# Patient Record
Sex: FEMALE | URBAN - METROPOLITAN AREA
[De-identification: names, ages, dates, MRNs, and addresses within clinical notes are randomized per-mention and may not be internally consistent; named-entity substitution may affect disease eponyms.]

---

## 2017-04-10 ENCOUNTER — MYC REFILL (OUTPATIENT)
Dept: FAMILY MEDICINE | Facility: CLINIC | Age: 32
End: 2017-04-10

## 2017-04-10 DIAGNOSIS — F33.1 MAJOR DEPRESSIVE DISORDER, RECURRENT EPISODE, MODERATE (H): ICD-10-CM

## 2017-04-10 DIAGNOSIS — F41.9 ANXIETY: ICD-10-CM

## 2017-04-10 NOTE — TELEPHONE ENCOUNTER
5/13/16 OV note say to f/u in 3 weeks.   Route to PCP to see about refilling until she can be seen next month.  Keiko Grey RN

## 2017-04-11 RX ORDER — ALPRAZOLAM 1 MG
1 TABLET ORAL PRN
Qty: 15 TABLET | Refills: 0 | Status: SHIPPED | OUTPATIENT
Start: 2017-04-11 | End: 2017-05-19

## 2017-05-19 ENCOUNTER — OFFICE VISIT (OUTPATIENT)
Dept: FAMILY MEDICINE | Facility: CLINIC | Age: 32
End: 2017-05-19
Payer: COMMERCIAL

## 2017-05-19 VITALS
DIASTOLIC BLOOD PRESSURE: 76 MMHG | SYSTOLIC BLOOD PRESSURE: 120 MMHG | BODY MASS INDEX: 35.36 KG/M2 | RESPIRATION RATE: 20 BRPM | HEIGHT: 70 IN | WEIGHT: 247 LBS | HEART RATE: 68 BPM | TEMPERATURE: 98.8 F

## 2017-05-19 DIAGNOSIS — E66.9 OBESITY, UNSPECIFIED OBESITY SEVERITY, UNSPECIFIED OBESITY TYPE: ICD-10-CM

## 2017-05-19 DIAGNOSIS — E55.9 VITAMIN D DEFICIENCY: ICD-10-CM

## 2017-05-19 DIAGNOSIS — Z13.220 LIPID SCREENING: ICD-10-CM

## 2017-05-19 DIAGNOSIS — Z13.29 SCREENING FOR THYROID DISORDER: ICD-10-CM

## 2017-05-19 DIAGNOSIS — R53.83 OTHER FATIGUE: ICD-10-CM

## 2017-05-19 DIAGNOSIS — Z13.1 SCREENING FOR DIABETES MELLITUS: ICD-10-CM

## 2017-05-19 DIAGNOSIS — F33.1 MAJOR DEPRESSIVE DISORDER, RECURRENT EPISODE, MODERATE (H): ICD-10-CM

## 2017-05-19 DIAGNOSIS — Z30.9 ENCOUNTER FOR CONTRACEPTIVE MANAGEMENT, UNSPECIFIED CONTRACEPTIVE ENCOUNTER TYPE: ICD-10-CM

## 2017-05-19 DIAGNOSIS — F41.9 ANXIETY: ICD-10-CM

## 2017-05-19 DIAGNOSIS — E28.2 PCOS (POLYCYSTIC OVARIAN SYNDROME): ICD-10-CM

## 2017-05-19 DIAGNOSIS — G47.00 INSOMNIA, UNSPECIFIED TYPE: ICD-10-CM

## 2017-05-19 DIAGNOSIS — Z00.01 ENCOUNTER FOR ROUTINE ADULT HEALTH EXAMINATION WITH ABNORMAL FINDINGS: Primary | ICD-10-CM

## 2017-05-19 LAB
ALBUMIN SERPL-MCNC: 3.4 G/DL (ref 3.4–5)
ALP SERPL-CCNC: 76 U/L (ref 40–150)
ALT SERPL W P-5'-P-CCNC: 21 U/L (ref 0–50)
ANION GAP SERPL CALCULATED.3IONS-SCNC: 10 MMOL/L (ref 3–14)
AST SERPL W P-5'-P-CCNC: 11 U/L (ref 0–45)
BILIRUB SERPL-MCNC: 0.4 MG/DL (ref 0.2–1.3)
BUN SERPL-MCNC: 9 MG/DL (ref 7–30)
CALCIUM SERPL-MCNC: 9 MG/DL (ref 8.5–10.1)
CHLORIDE SERPL-SCNC: 105 MMOL/L (ref 94–109)
CHOLEST SERPL-MCNC: 145 MG/DL
CO2 SERPL-SCNC: 25 MMOL/L (ref 20–32)
CREAT SERPL-MCNC: 0.65 MG/DL (ref 0.52–1.04)
GFR SERPL CREATININE-BSD FRML MDRD: NORMAL ML/MIN/1.7M2
GLUCOSE SERPL-MCNC: 93 MG/DL (ref 70–99)
HDLC SERPL-MCNC: 74 MG/DL
HGB BLD-MCNC: 13 G/DL (ref 11.7–15.7)
LDLC SERPL CALC-MCNC: 55 MG/DL
NONHDLC SERPL-MCNC: 71 MG/DL
POTASSIUM SERPL-SCNC: 4.2 MMOL/L (ref 3.4–5.3)
PROT SERPL-MCNC: 7.9 G/DL (ref 6.8–8.8)
SODIUM SERPL-SCNC: 140 MMOL/L (ref 133–144)
TRIGL SERPL-MCNC: 78 MG/DL
TSH SERPL DL<=0.005 MIU/L-ACNC: 0.84 MU/L (ref 0.4–4)

## 2017-05-19 PROCEDURE — 80053 COMPREHEN METABOLIC PANEL: CPT | Performed by: FAMILY MEDICINE

## 2017-05-19 PROCEDURE — 84443 ASSAY THYROID STIM HORMONE: CPT | Performed by: FAMILY MEDICINE

## 2017-05-19 PROCEDURE — 99395 PREV VISIT EST AGE 18-39: CPT | Performed by: FAMILY MEDICINE

## 2017-05-19 PROCEDURE — 36415 COLL VENOUS BLD VENIPUNCTURE: CPT | Performed by: FAMILY MEDICINE

## 2017-05-19 PROCEDURE — 99212 OFFICE O/P EST SF 10 MIN: CPT | Mod: 25 | Performed by: FAMILY MEDICINE

## 2017-05-19 PROCEDURE — 85018 HEMOGLOBIN: CPT | Performed by: FAMILY MEDICINE

## 2017-05-19 PROCEDURE — 80061 LIPID PANEL: CPT | Performed by: FAMILY MEDICINE

## 2017-05-19 RX ORDER — DESOGESTREL AND ETHINYL ESTRADIOL 0.15-0.03
1 KIT ORAL DAILY
Qty: 28 TABLET | Refills: 11 | Status: SHIPPED | OUTPATIENT
Start: 2017-05-19 | End: 2018-05-24

## 2017-05-19 RX ORDER — FLUOXETINE 40 MG/1
40 CAPSULE ORAL DAILY
Qty: 90 CAPSULE | Refills: 1 | Status: SHIPPED | OUTPATIENT
Start: 2017-05-19 | End: 2017-10-10

## 2017-05-19 RX ORDER — ZOLPIDEM TARTRATE 10 MG/1
TABLET ORAL
Qty: 30 TABLET | Refills: 3 | Status: SHIPPED | OUTPATIENT
Start: 2017-05-19

## 2017-05-19 RX ORDER — METFORMIN HCL 500 MG
500 TABLET, EXTENDED RELEASE 24 HR ORAL
Qty: 90 TABLET | Refills: 1 | Status: SHIPPED | OUTPATIENT
Start: 2017-05-19

## 2017-05-19 RX ORDER — ALPRAZOLAM 1 MG
1 TABLET ORAL PRN
Qty: 15 TABLET | Refills: 0 | Status: SHIPPED | OUTPATIENT
Start: 2017-05-19 | End: 2018-01-30

## 2017-05-19 ASSESSMENT — ANXIETY QUESTIONNAIRES
1. FEELING NERVOUS, ANXIOUS, OR ON EDGE: MORE THAN HALF THE DAYS
6. BECOMING EASILY ANNOYED OR IRRITABLE: MORE THAN HALF THE DAYS
3. WORRYING TOO MUCH ABOUT DIFFERENT THINGS: MORE THAN HALF THE DAYS
5. BEING SO RESTLESS THAT IT IS HARD TO SIT STILL: SEVERAL DAYS
2. NOT BEING ABLE TO STOP OR CONTROL WORRYING: MORE THAN HALF THE DAYS
7. FEELING AFRAID AS IF SOMETHING AWFUL MIGHT HAPPEN: MORE THAN HALF THE DAYS
GAD7 TOTAL SCORE: 14

## 2017-05-19 ASSESSMENT — PATIENT HEALTH QUESTIONNAIRE - PHQ9: 5. POOR APPETITE OR OVEREATING: NEARLY EVERY DAY

## 2017-05-19 NOTE — LETTER
[address] [address] [phone]      May 22, 2017      Ada LISA Jose Raul  [address]  [place] [address]          Dear Ms. Blunt    The results of your recent lab tests were within normal limits. Enclosed is a copy of these results.  If you have any further questions or problems, please contact our office.    Sincerely,      Corine Delgado, DO/hl    Results for orders placed or performed in visit on 05/19/17   Lipid Profile with reflex to direct LDL   Result Value Ref Range    Cholesterol 145 <200 mg/dL    Triglycerides 78 <150 mg/dL    HDL Cholesterol 74 >49 mg/dL    LDL Cholesterol Calculated 55 <100 mg/dL    Non HDL Cholesterol 71 <130 mg/dL   TSH with free T4 reflex   Result Value Ref Range    TSH 0.84 0.40 - 4.00 mU/L   Comprehensive metabolic panel   Result Value Ref Range    Sodium 140 133 - 144 mmol/L    Potassium 4.2 3.4 - 5.3 mmol/L    Chloride 105 94 - 109 mmol/L    Carbon Dioxide 25 20 - 32 mmol/L    Anion Gap 10 3 - 14 mmol/L    Glucose 93 70 - 99 mg/dL    Urea Nitrogen 9 7 - 30 mg/dL    Creatinine 0.65 0.52 - 1.04 mg/dL    GFR Estimate >90  Non  GFR Calc   >60 mL/min/1.7m2    GFR Estimate If Black >90   GFR Calc   >60 mL/min/1.7m2    Calcium 9.0 8.5 - 10.1 mg/dL    Bilirubin Total 0.4 0.2 - 1.3 mg/dL    Albumin 3.4 3.4 - 5.0 g/dL    Protein Total 7.9 6.8 - 8.8 g/dL    Alkaline Phosphatase 76 40 - 150 U/L    ALT 21 0 - 50 U/L    AST 11 0 - 45 U/L   Hemoglobin   Result Value Ref Range    Hemoglobin 13.0 11.7 - 15.7 g/dL

## 2017-05-19 NOTE — PROGRESS NOTES
SUBJECTIVE:     CC: Ada Blunt is an 32 year old woman who presents for preventive health visit.     Physical   Annual:     Getting at least 3 servings of Calcium per day::  NO    Bi-annual eye exam::  Yes    Dental care twice a year::  Yes    Sleep apnea or symptoms of sleep apnea::  Daytime drowsiness, Excessive snoring and Sleep apnea    Diet::  Regular (no restrictions)    Frequency of exercise::  None    Taking medications regularly::  No    Barriers to taking medications::  Problems remembering to take them    Medication side effects::  Not applicable    Additional concerns today::  YES      -Also wondering about sleep study- snoring and gasping at times  -snoring , leg twitching,     Depression and Anxiety Follow-Up    Status since last visit: No change    Other associated symptoms: Ups and downs    Complicating factors:     Significant life event: Yes-  Father was in a motorcylce accident- dealing with that- health taking care of him     Current substance abuse: None    PHQ-9 SCORE 7/12/2012 5/11/2016 5/17/2017   Total Score - - -   Total Score MyChart 4 13 (Moderate depression) 15 (Moderately severe depression)     LUCA-7 SCORE 5/19/2017   Total Score 14        PHQ-9  English      PHQ-9   Any Language     GAD7       Today's PHQ-2 Score:   PHQ-2 ( 1999 [employer]) 5/17/2017   Q1: Little interest or pleasure in doing things Nearly every day   Q2: Feeling down, depressed or hopeless Several days   PHQ-2 Score 4        Abuse: Current or Past(Physical, Sexual or Emotional)- No  Do you feel safe in your environment - Yes    Social History   Substance Use Topics     Smoking status: Never Smoker     Smokeless tobacco: Never Used     Alcohol use Yes      Comment: 1-2 drinks weekly     The patient does not drink >3 drinks per day nor >7 drinks per week.    Recent Labs   Lab Test  01/05/10   1037   CHOL  119   HDL  51   LDL  40   TRIG  144   CHOLHDLRATIO  2.4       Reviewed orders with patient.  Reviewed health  "maintenance and updated orders accordingly - Yes    Mammo Decision Support:  Mammogram not appropriate for this patient based on age.    Pertinent mammograms are reviewed under the imaging tab.  History of abnormal Pap smear: NO - age 21-29 PAP every 3 years recommended    Reviewed and updated as needed this visit by clinical staff  Tobacco  Allergies  Meds  Med Hx  Surg Hx  Fam Hx  Soc Hx        Reviewed and updated as needed this visit by Provider        Past Medical History:   Diagnosis Date     Anxiety 2012     Anxiety problem     related to travel     Depressive disorder      Mild major depression (H)      Seasonal allergies       Past Surgical History:   Procedure Laterality Date     GYN SURGERY  2015    D and C for AB     REMOVAL OF NAIL PLATE      wedge resection      Obstetric History       T0      L0     SAB0   TAB0   Ectopic0   Multiple0   Live Births0           ROS:  C: NEGATIVE for fever, chills, change in weight  I: NEGATIVE for worrisome rashes, moles or lesions  E: NEGATIVE for vision changes or irritation  ENT: NEGATIVE for ear, mouth and throat problems  R: NEGATIVE for significant cough or SOB  B: NEGATIVE for masses, tenderness or discharge  CV: NEGATIVE for chest pain, palpitations or peripheral edema  GI: NEGATIVE for nausea, abdominal pain, heartburn, or change in bowel habits  : NEGATIVE for unusual urinary or vaginal symptoms. Periods are regular.  M: NEGATIVE for significant arthralgias or myalgia  N: NEGATIVE for weakness, dizziness or paresthesias  P: NEGATIVE for changes in mood or affect    Problem list, Medication list, Allergies, and Medical/Social/Surgical histories reviewed in [facility] and updated as appropriate.  OBJECTIVE:     /76 (BP Location: Right arm, Patient Position: Chair, Cuff Size: Adult Large)  Pulse 68  Temp 98.8  F (37.1  C) (Oral)  Resp 20  Ht 5' 9.5\" (1.765 m)  Wt 247 lb (112 kg)  LMP 2017  BMI 35.95 kg/m2  EXAM:  GENERAL: " healthy, alert and no distress  EYES: Eyes grossly normal to inspection, PERRL and conjunctivae and sclerae normal  HENT: ear canals and TM's normal, nose and mouth without ulcers or lesions  NECK: no adenopathy, no asymmetry, masses, or scars and thyroid normal to palpation  RESP: lungs clear to auscultation - no rales, rhonchi or wheezes  BREAST: normal without masses, tenderness or nipple discharge and no palpable axillary masses or adenopathy  CV: regular rate and rhythm, normal S1 S2, no S3 or S4, no murmur, click or rub, no peripheral edema and peripheral pulses strong  ABDOMEN: soft, nontender, no hepatosplenomegaly, no masses and bowel sounds normal  MS: no gross musculoskeletal defects noted, no edema  SKIN: no suspicious lesions or rashes  NEURO: Normal strength and tone, mentation intact and speech normal  PSYCH: mentation appears normal, affect normal/bright    ASSESSMENT/PLAN:         ICD-10-CM    1. Encounter for routine adult health examination with abnormal findings Z00.01    2. PCOS (polycystic ovarian syndrome) E28.2 metFORMIN (GLUCOPHAGE-XR) 500 MG 24 hr tablet   3. Major depressive disorder, recurrent episode, moderate (H) F33.1 ALPRAZolam (XANAX) 1 MG tablet     FLUoxetine (PROZAC) 40 MG capsule     FLUoxetine (PROZAC) 20 MG capsule     MENTAL HEALTH REFERRAL   4. Anxiety F41.9 ALPRAZolam (XANAX) 1 MG tablet   5. Encounter for contraceptive management, unspecified contraceptive encounter type Z30.9 desogestrel-ethinyl estradiol (APRI) 0.15-30 MG-MCG per tablet   6. Insomnia, unspecified type G47.00 zolpidem (AMBIEN) 10 MG tablet     SLEEP EVALUATION & MANAGEMENT REFERRAL - ADULT   7. Other fatigue R53.83 TSH with free T4 reflex     Comprehensive metabolic panel     Hemoglobin   8. Obesity, unspecified obesity severity, unspecified obesity type E66.9 BARIATRIC ADULT REFERRAL     TSH with free T4 reflex     Comprehensive metabolic panel   9. Screening for diabetes mellitus Z13.1    10. Screening  "for thyroid disorder Z13.29    11. Vitamin D deficiency E55.9 Hemoglobin   12. Lipid screening Z13.220 Lipid Profile with reflex to direct LDL     Anxiety/depression-Follow up with therapyContinue on current meds  Insomnia-Sleep study  Follow up with weight management  PCOS/obesity-Start metformin , reviewed risks and benefits of med    COUNSELING:  Reviewed preventive health counseling, as reflected in patient instructions         reports that she has never smoked. She has never used smokeless tobacco.    Estimated body mass index is 35.95 kg/(m^2) as calculated from the following:    Height as of this encounter: 5' 9.5\" (1.765 m).    Weight as of this encounter: 247 lb (112 kg).   Weight management plan: Discussed healthy diet and exercise guidelines and patient will follow up in 6 months in clinic to re-evaluate.    Counseling Resources:  ATP IV Guidelines  Pooled Cohorts Equation Calculator  Breast Cancer Risk Calculator  FRAX Risk Assessment  ICSI Preventive Guidelines  Dietary Guidelines for Americans, 2010  USDA's MyPlate  ASA Prophylaxis  Lung CA Screening    Corine Delgado DO  [facility]  "

## 2017-05-19 NOTE — PATIENT INSTRUCTIONS
Follow up with therapy  Sleep study  Continue on current meds  Follow up with weight management  Start metformin   Preventive Health Recommendations  Female Ages 26 - 39  Yearly exam:   See your health care provider every year in order to    Review health changes.     Discuss preventive care.      Review your medicines if you your doctor has prescribed any.    Until age 30: Get a Pap test every three years (more often if you have had an abnormal result).    After age 30: Talk to your doctor about whether you should have a Pap test every 3 years or have a Pap test with HPV screening every 5 years.   You do not need a Pap test if your uterus was removed (hysterectomy) and you have not had cancer.  You should be tested each year for STDs (sexually transmitted diseases), if you're at risk.   Talk to your provider about how often to have your cholesterol checked.  If you are at risk for diabetes, you should have a diabetes test (fasting glucose).  Shots: Get a flu shot each year. Get a tetanus shot every 10 years.   Nutrition:     Eat at least 5 servings of fruits and vegetables each day.    Eat whole-grain bread, whole-wheat pasta and brown rice instead of white grains and rice.    Talk to your provider about Calcium and Vitamin D.     Lifestyle    Exercise at least 150 minutes a week (30 minutes a day, 5 days of the week). This will help you control your weight and prevent disease.    Limit alcohol to one drink per day.    No smoking.     Wear sunscreen to prevent skin cancer.    See your dentist every six months for an exam and cleaning.      [facility]   Discharged by : Nohelia WOODWARD Certified Medical Assistant (AAMA)May 19, 2017 9:50 AM    Paper scripts provided to patient :    Disp Refills Start End NATO      ALPRAZolam (XANAX) 1 MG tablet 15 tablet 0 5/19/2017  No     Sig: Take 1 tablet (1 mg) by mouth as needed for anxiety         Disp Refills Start End NATO      zolpidem (AMBIEN) 10 MG tablet 30 tablet  3 5/19/2017  No     Sig: Use 1/2-1 tab daily as needed       If you have any questions regarding to your visit please contact your care team:   [facility] Clinic Hours Telephone Number   Dr. Nohelia Mckeon, [place]   7am-7pm Monday - Thursday   7am-5pm Fridays  [phone]   (Appointment scheduling available 24/7)   RN Line   [phone] option 2     What options do I have for visits at the clinic other than the traditional office visit?   To expand how we care for you, many of our providers are utilizing electronic visits (e-visits) and telephone visits, when medically appropriate, for interactions with their patients rather than a visit in the clinic. We also offer nurse visits for many medical concerns. Just like any other service, we will bill your insurance company for this type of visit based on time spent on the phone with your provider. Not all insurance companies cover these visits. Please check with your medical insurance if this type of visit is covered. You will be responsible for any charges that are not paid by your insurance.   E-visits via [employer]: generally incur a $35.00 fee.   Telephone visits:   Time spent on the phone: *charged based on time that is spent on the phone in increments of 10 minutes. Estimated cost:   5-10 mins $30.00   11-20 mins. $59.00   21-30 mins. $85.00   Use [employer] (secure email communication and access to your chart) to send your primary care provider a message or make an appointment. Ask someone on your Team how to sign up for [employer].   For a Price Quote for your services, please call our Consumer Price Line at [phone].   As always, Thank you for trusting us with your health care needs!                    [facility]:    Scheduling Appointments  [facility]  Call: [phone]    [facility]  Call: [phone]    [facility]  Locations  Call: [phone]    WHERE TO GO FOR CARE?    Clinic    Make an appointment if you:       Are sick (cold, cough, flu, sore throat, earache or in pain).       Have a small injury (sprain, small cut, burn or broken bone).       Need a physical exam, Pap smear, vaccine or prescription refill.       Have questions about your health or medicines.    To reach us:      Call [phone] ([phone]). Open 24 hours every day. (For counseling services, call [phone].)    Log into [employer] at [employer]. (Visit [facility] to create an account.) Hospital emergency room    An emergency is a serious or life- threatening problem that must be treated right away.    Call 408 or get to the hospital if you have:      Very bad or sudden:            - Chest pain or pressure         - Bleeding         - Head or belly pain         - Dizziness or trouble seeing, walking or                          Speaking      Problems breathing      Blood in your vomit or you are coughing up blood      A major injury (knocked out, loss of a finger or limb, rape, broken bone protruding from skin)    A mental health crisis. (Or call the Mental Health Crisis line at 1-628.393.7802 or Suicide Prevention Hotline at 1-877.532.2476.)    Open 24 hours every day. You don't need an appointment.     Urgent care    Visit urgent care for sickness or small injuries when the clinic is closed. You don't need an appointment. To check hours or find an urgent care near you, visit [email or web address]. Online care    Get online care from [email or web address] for more than 70 common problems, like colds, allergies and infections. Open 24 hours every day at: [email or web address]   Need help deciding?    For advice about where to be seen, you may call your clinic and ask to speak with a nurse. We're here for you 24 hours every day.         If you are deaf or hard of hearing, please let us know. We provide many free services including sign  language interpreters, oral interpreters, TTYs, telephone amplifiers, note takers and written materials.

## 2017-05-19 NOTE — MR AVS SNAPSHOT
After Visit Summary   5/19/2017    Ada Blunt    MRN: [id number]           Patient Information     Date Of Birth          1985        Visit Information        Provider Department      5/19/2017 9:00 AM Corine Delgado DO [facility]        Today's Diagnoses     Obesity, unspecified obesity severity, unspecified obesity type    -  1    Major depressive disorder, recurrent episode, moderate (H)        Anxiety        Encounter for contraceptive management, unspecified contraceptive encounter type        PCOS (polycystic ovarian syndrome)        Insomnia, unspecified type        Screening for diabetes mellitus        Screening for thyroid disorder        Other fatigue        Vitamin D deficiency        Lipid screening          Care Instructions    Follow up with therapy  Sleep study  Continue on current meds  Follow up with weight management  Start metformin   Preventive Health Recommendations  Female Ages 26 - 39  Yearly exam:   See your health care provider every year in order to    Review health changes.     Discuss preventive care.      Review your medicines if you your doctor has prescribed any.    Until age 30: Get a Pap test every three years (more often if you have had an abnormal result).    After age 30: Talk to your doctor about whether you should have a Pap test every 3 years or have a Pap test with HPV screening every 5 years.   You do not need a Pap test if your uterus was removed (hysterectomy) and you have not had cancer.  You should be tested each year for STDs (sexually transmitted diseases), if you're at risk.   Talk to your provider about how often to have your cholesterol checked.  If you are at risk for diabetes, you should have a diabetes test (fasting glucose).  Shots: Get a flu shot each year. Get a tetanus shot every 10 years.   Nutrition:     Eat at least 5 servings of fruits and vegetables each day.    Eat whole-grain bread, whole-wheat pasta  and brown rice instead of white grains and rice.    Talk to your provider about Calcium and Vitamin D.     Lifestyle    Exercise at least 150 minutes a week (30 minutes a day, 5 days of the week). This will help you control your weight and prevent disease.    Limit alcohol to one drink per day.    No smoking.     Wear sunscreen to prevent skin cancer.    See your dentist every six months for an exam and cleaning.      [facility]   Discharged by : Nohelia WOODWARD, Certified Medical Assistant (AAMA)May 19, 2017 9:50 AM    Paper scripts provided to patient :    Disp Refills Start End NATO      ALPRAZolam (XANAX) 1 MG tablet 15 tablet 0 5/19/2017  No     Sig: Take 1 tablet (1 mg) by mouth as needed for anxiety         Disp Refills Start End NATO      zolpidem (AMBIEN) 10 MG tablet 30 tablet 3 5/19/2017  No     Sig: Use 1/2-1 tab daily as needed       If you have any questions regarding to your visit please contact your care team:   [facility] Clinic Hours Telephone Number   Dr. Nohelia Mckeon PA-C   7am-7pm Monday - Thursday   7am-5pm Fridays  [phone]   (Appointment scheduling available 24/7)   RN Line   [phone] option 2     What options do I have for visits at the clinic other than the traditional office visit?   To expand how we care for you, many of our providers are utilizing electronic visits (e-visits) and telephone visits, when medically appropriate, for interactions with their patients rather than a visit in the clinic. We also offer nurse visits for many medical concerns. Just like any other service, we will bill your insurance company for this type of visit based on time spent on the phone with your provider. Not all insurance companies cover these visits. Please check with your medical insurance if this type of visit is covered. You will be responsible for any charges that are not paid by your insurance.   E-visits via [employer]: generally  incur a $35.00 fee.   Telephone visits:   Time spent on the phone: *charged based on time that is spent on the phone in increments of 10 minutes. Estimated cost:   5-10 mins $30.00   11-20 mins. $59.00   21-30 mins. $85.00   Use Retrieve (secure email communication and access to your chart) to send your primary care provider a message or make an appointment. Ask someone on your Team how to sign up for Retrieve.   For a Price Quote for your services, please call our [employer] Price Line at [phone].   As always, Thank you for trusting us with your health care needs!                    [facility]:    Scheduling Appointments  [facility]  Call: [phone]    [facility]  Call: [phone]    [facility]  Call: [phone]    WHERE TO GO FOR CARE?    Clinic    Make an appointment if you:       Are sick (cold, cough, flu, sore throat, earache or in pain).       Have a small injury (sprain, small cut, burn or broken bone).       Need a physical exam, Pap smear, vaccine or prescription refill.       Have questions about your health or medicines.    To reach us:      Call [phone] ([phone]). Open 24 hours every day. (For counseling services, call [phone].)    Log into Retrieve at [email or web address]. (Visit [email or web address] to create an account.) Hospital emergency room    An emergency is a serious or life- threatening problem that must be treated right away.    Call [phone] or get to the hospital if you have:      Very bad or sudden:            - Chest pain or pressure         - Bleeding         - Head or belly pain         - Dizziness or trouble seeing, walking or                          Speaking      Problems breathing      Blood in your vomit or you are coughing up blood      A major injury (knocked out, loss of a finger or limb, rape, broken bone protruding from skin)    A mental health crisis. (Or call  the Mental Health Crisis line at 1-170.373.2712 or Suicide Prevention Hotline at 1-481.449.6919.)    Open 24 hours every day. You don't need an appointment.     Urgent care    Visit urgent care for sickness or small injuries when the clinic is closed. You don't need an appointment. To check hours or find an urgent care near you, visit www.[email or web address].[email or web address]. Online care    Get online care from [employer] for more than 70 common problems, like colds, allergies and infections. Open 24 hours every day at: [email or web address]   Need help deciding?    For advice about where to be seen, you may call your clinic and ask to speak with a nurse. We're here for you 24 hours every day.         If you are deaf or hard of hearing, please let us know. We provide many free services including sign language interpreters, oral interpreters, TTYs, telephone amplifiers, note takers and written materials.               Follow-ups after your visit        Additional Services     BARIATRIC ADULT REFERRAL       Your provider has referred you to: [facility]: [facility] [phone]   [email or web address]    Please be aware that coverage of these services is subject to the terms and limitations of your health insurance plan.  Call member services at your health plan with any benefit or coverage questions.      Please bring the following with you to your appointment:      (1) List of current medications   (2) This referral request   (3) Any documents/labs given to you for this referral            MENTAL HEALTH REFERRAL       Your provider has referred you to: [facility]: [facility] - Counseling (Individual/Couples/Family) - [facility] [phone]   [email or web address]   *Patient will be contacted by [facility] scheduling partner, [facility]), to schedule an appointment.   Patients may also call [facility] to schedule.    All scheduling is subject to the client's specific insurance plan & benefits, provider/location availability, and provider clinical specialities.  Please arrive 15 minutes early for your first appointment and bring your completed paperwork.    Please be aware that coverage of these services is subject to the terms and limitations of your health insurance plan.  Call member services at your health plan with any benefit or coverage questions.            SLEEP EVALUATION & MANAGEMENT REFERRAL - ADULT       Please be aware that coverage of these services is subject to the terms and limitations of your health insurance plan.  Call member services at your health plan with any benefit or coverage questions.      Please bring the following to your appointment:    >>   List of current medications   >>   This referral request   >>   Any documents/labs given to you for this referral    [facility]  Ph [phone] (Age 15 and up)                  Future tests that were ordered for you today     Open Future Orders        Priority Expected Expires Ordered    SLEEP EVALUATION & MANAGEMENT REFERRAL - ADULT Routine  5/19/2018 5/19/2017            Who to contact     If you have questions or need follow up information about today's clinic visit or your schedule please contact [facility] directly at [phone].  Normal or non-critical lab and imaging results will be communicated to you by MyChart, letter or phone within 4 business days after the clinic has received the results. If you do not hear from us within 7 days, please contact the clinic through MyChart or phone. If you have a critical or abnormal lab result, we will notify you by phone as soon as possible.  Submit refill requests through [employer] or call your pharmacy and they will forward the refill request to us. Please allow 3 business days for your refill to be completed.           "Additional Information About Your Visit        MyChart Information     [employer] gives you secure access to your electronic health record. If you see a primary care provider, you can also send messages to your care team and make appointments. If you have questions, please call your primary care clinic.  If you do not have a primary care provider, please call [phone] and they will assist you.        Care EveryWhere ID     This is your Care EveryWhere ID. This could be used by other organizations to access your [facility] medical records  [id number]        Your Vitals Were     Pulse Temperature Respirations Height Last Period BMI (Body Mass Index)    68 98.8  F (37.1  C) (Oral) 20 5' 9.5\" (1.765 m) 05/13/2017 35.95 kg/m2       Blood Pressure from Last 3 Encounters:   05/19/17 120/76   05/13/16 122/74   09/13/12 128/78    Weight from Last 3 Encounters:   05/19/17 247 lb (112 kg)   05/13/16 229 lb 6.4 oz (104.1 kg)   09/13/12 262 lb (118.8 kg)              We Performed the Following     BARIATRIC ADULT REFERRAL     Comprehensive metabolic panel     Hemoglobin     Lipid Profile with reflex to direct LDL     MENTAL HEALTH REFERRAL     TSH with free T4 reflex          Today's Medication Changes          These changes are accurate as of: 5/19/17  9:50 AM.  If you have any questions, ask your nurse or doctor.               Start taking these medicines.        Dose/Directions    metFORMIN 500 MG 24 hr tablet   Commonly known as:  GLUCOPHAGE-XR   Used for:  PCOS (polycystic ovarian syndrome)   Started by:  Corine Delgado DO        Dose:  500 mg   Take 1 tablet (500 mg) by mouth daily (with dinner)   Quantity:  90 tablet   Refills:  1            Where to get your medicines      These medications were sent to [facility]/pharmacy #[id number] - [place] - [address] [place] [address] [address]  [address] [place] [address]     Phone:  [phone]     desogestrel-ethinyl estradiol 0.15-30 MG-MCG per " tablet    FLUoxetine 20 MG capsule    FLUoxetine 40 MG capsule    metFORMIN 500 MG 24 hr tablet         Some of these will need a paper prescription and others can be bought over the counter.  Ask your nurse if you have questions.     Bring a paper prescription for each of these medications     ALPRAZolam 1 MG tablet    zolpidem 10 MG tablet                Primary Care Provider Office Phone # Fax #    Corine Delgado [phone]       [address] [address]        Thank you!     Thank you for choosing [facility]  for your care. Our goal is always to provide you with excellent care. Hearing back from our patients is one way we can continue to improve our services. Please take a few minutes to complete the written survey that you may receive in the mail after your visit with us. Thank you!             Your Updated Medication List - Protect others around you: Learn how to safely use, store and throw away your medicines at www.disposemymeds.org.          This list is accurate as of: 5/19/17  9:50 AM.  Always use your most recent med list.                   Brand Name Dispense Instructions for use    ALPRAZolam 1 MG tablet    XANAX    15 tablet    Take 1 tablet (1 mg) by mouth as needed for anxiety       calcium-vitamin D 500-125 MG-UNIT Tabs      Take 2 tablets by mouth daily       desogestrel-ethinyl estradiol 0.15-30 MG-MCG per tablet    APRI    28 tablet    Take 1 tablet by mouth daily       * FLUoxetine 40 MG capsule    PROzac    90 capsule    Take 1 capsule (40 mg) by mouth daily       * FLUoxetine 20 MG capsule    PROzac    90 capsule    Take 1 capsule (20 mg) by mouth daily Take with 40mg to = 60mg daily       fluticasone 50 MCG/ACT spray    FLONASE    1 g    Spray 2 sprays into both nostrils daily       Iron Tabs      25mg-- Once Daily       metFORMIN 500 MG 24 hr tablet    GLUCOPHAGE-XR    90 tablet    Take 1 tablet  (500 mg) by mouth daily (with dinner)       multivitamin, therapeutic with minerals Tabs tablet      Take 1 tablet by mouth daily       probiotic Caps      Take 1 tablet by mouth daily 200mg       Vitamin D3 2000 UNITS Tabs      Take 1 tablet by mouth daily       zolpidem 10 MG tablet    AMBIEN    30 tablet    Use 1/2-1 tab daily as needed       * Notice:  This list has 2 medication(s) that are the same as other medications prescribed for you. Read the directions carefully, and ask your doctor or other care provider to review them with you.

## 2017-05-19 NOTE — NURSING NOTE
"Chief Complaint   Patient presents with     Physical     Pre Visit Planning - Done       Initial /76 (BP Location: Right arm, Patient Position: Chair, Cuff Size: Adult Large)  Pulse 68  Temp 98.8  F (37.1  C) (Oral)  Resp 20  Ht 5' 9.5\" (1.765 m)  Wt 247 lb (112 kg)  LMP 05/13/2017  BMI 35.95 kg/m2 Estimated body mass index is 35.95 kg/(m^2) as calculated from the following:    Height as of this encounter: 5' 9.5\" (1.765 m).    Weight as of this encounter: 247 lb (112 kg).  Medication Reconciliation: complete    "

## 2017-05-20 ASSESSMENT — ANXIETY QUESTIONNAIRES: GAD7 TOTAL SCORE: 14

## 2017-10-10 DIAGNOSIS — F33.1 MAJOR DEPRESSIVE DISORDER, RECURRENT EPISODE, MODERATE (H): ICD-10-CM

## 2017-10-10 RX ORDER — FLUOXETINE 40 MG/1
40 CAPSULE ORAL DAILY
Qty: 30 CAPSULE | Refills: 0 | Status: SHIPPED | OUTPATIENT
Start: 2017-10-10 | End: 2017-11-21

## 2017-10-10 NOTE — TELEPHONE ENCOUNTER
Medication Detail      Disp Refills Start End NATO   FLUoxetine (PROZAC) 40 MG capsule 90 capsule 1 5/19/2017  No   Sig: Take 1 capsule (40 mg) by mouth daily   Class: E-Prescribe   Route: Oral   Order: [id number]   E-Prescribing Status: Receipt confirmed by pharmacy (5/19/2017  9:43 AM CDT)       Last Office Visit with [facility] primary care provider:  5/17/2017        Last PHQ-9 score on record=   PHQ-9 SCORE 5/17/2017   Total Score -   Total Score MyChart 15 (Moderately severe depression)

## 2017-11-09 ENCOUNTER — TELEPHONE (OUTPATIENT)
Dept: FAMILY MEDICINE | Facility: CLINIC | Age: 32
End: 2017-11-09

## 2017-11-09 DIAGNOSIS — F33.1 MAJOR DEPRESSIVE DISORDER, RECURRENT EPISODE, MODERATE (H): ICD-10-CM

## 2017-11-09 NOTE — TELEPHONE ENCOUNTER
Please repeat PHQ-9.  Index date: 5/19/17  Follow up start date:  10/19/17  Follow up end date:  12/19/17    Esha Wahl MA

## 2017-11-13 NOTE — TELEPHONE ENCOUNTER
Called patient- she will complete the online questionnaire but has been sick lately.   Keiko Grey RN

## 2017-11-20 ASSESSMENT — PATIENT HEALTH QUESTIONNAIRE - PHQ9: SUM OF ALL RESPONSES TO PHQ QUESTIONS 1-9: 10

## 2017-11-20 NOTE — TELEPHONE ENCOUNTER
Route for celexa refill- last visit was 5/19 & wants to come in yearly. PHQ-9=10, (improved from 15 in May) patient states that she is unchanged & would like to continue her dose. Informed that a visit is required every 6 months for depression, but patient states she normally comes in yearly. 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   Keiko Grey RN

## 2017-11-21 RX ORDER — FLUOXETINE 40 MG/1
40 CAPSULE ORAL DAILY
Qty: 90 CAPSULE | Refills: 1 | Status: SHIPPED | OUTPATIENT
Start: 2017-11-21

## 2018-01-25 ENCOUNTER — MYC REFILL (OUTPATIENT)
Dept: FAMILY MEDICINE | Facility: CLINIC | Age: 33
End: 2018-01-25

## 2018-01-25 DIAGNOSIS — F41.9 ANXIETY: ICD-10-CM

## 2018-01-25 DIAGNOSIS — F33.1 MAJOR DEPRESSIVE DISORDER, RECURRENT EPISODE, MODERATE (H): ICD-10-CM

## 2018-01-25 RX ORDER — ALPRAZOLAM 1 MG
1 TABLET ORAL PRN
Qty: 15 TABLET | Refills: 0 | Status: CANCELLED | OUTPATIENT
Start: 2018-01-25

## 2018-01-29 ENCOUNTER — MYC MEDICAL ADVICE (OUTPATIENT)
Dept: FAMILY MEDICINE | Facility: CLINIC | Age: 33
End: 2018-01-29

## 2018-01-29 DIAGNOSIS — F33.1 MAJOR DEPRESSIVE DISORDER, RECURRENT EPISODE, MODERATE (H): ICD-10-CM

## 2018-01-29 DIAGNOSIS — F41.9 ANXIETY: ICD-10-CM

## 2018-01-29 DIAGNOSIS — Z11.3 SCREEN FOR STD (SEXUALLY TRANSMITTED DISEASE): Primary | ICD-10-CM

## 2018-01-30 RX ORDER — ALPRAZOLAM 1 MG
1 TABLET ORAL PRN
Qty: 15 TABLET | Refills: 0 | Status: SHIPPED | OUTPATIENT
Start: 2018-01-30

## 2018-01-30 NOTE — TELEPHONE ENCOUNTER
Blood work ordered  Anxiety and depression patients need to be seen every 6 months per protocol  If she does not want to come until may then I would advise talking to the provider about the use of ativan.  I can refill for now.  Corine Delgado D.O.

## 2018-01-30 NOTE — TELEPHONE ENCOUNTER
Prescription for Xanax filed at the  for patient to . Pocketbook message sent to patient. Unable to leave a voice mail - phone number is not in service.   Afia Kennedy CMA ([facility])

## 2018-02-21 NOTE — TELEPHONE ENCOUNTER
Patient calls nurse line, states she got a call a few days ago, she thinks about Xanax and is returning call.  Writer sees previous note, verified script still up front and notified patient it is still ready for her to pick-up.  Patient at first requested to fax to pharmacy, but then states she will be in the area tomorrow, 2/22/18, and will just pick it up from the clinic.  Isatu Edmond RN

## 2018-02-26 NOTE — TELEPHONE ENCOUNTER
An Item was picked up at the [facility] :    Date it was picked up?  02/26/2018     What was picked up?  Envelope/script     Who picked them up?  Ada Blunt    Relationship to patient? Patient     Did they show ID?  Yes    Was it logged in book? Yes    Who gave it to the patient?  Lea Ramirze, Patient Representative

## 2018-02-28 NOTE — TELEPHONE ENCOUNTER
Correspondence with patient re: refill addressed in another encounter; see encounter from 1/25/18.  Close encounter.   Isatu Edmond RN

## 2018-05-24 ENCOUNTER — TELEPHONE (OUTPATIENT)
Dept: FAMILY MEDICINE | Facility: CLINIC | Age: 33
End: 2018-05-24

## 2018-05-24 DIAGNOSIS — Z30.9 ENCOUNTER FOR CONTRACEPTIVE MANAGEMENT: ICD-10-CM

## 2018-05-24 RX ORDER — DESOGESTREL AND ETHINYL ESTRADIOL 0.15-0.03
KIT ORAL
Qty: 28 TABLET | Refills: 0 | Status: SHIPPED | OUTPATIENT
Start: 2018-05-24

## 2018-05-24 NOTE — TELEPHONE ENCOUNTER
"Requested Prescriptions   Pending Prescriptions Disp Refills     APRI 0.15-30 MG-MCG per tablet [Pharmacy Med Name: APRI 28 DAY TABLET]  Last Written Prescription Date:  5/19/2017  Last Fill Quantity: 28 tablet,  # refills: 11   Last office visit: 5/19/2017 with prescribing provider:  WILLIS Delgado   Future Office Visit:     28 tablet 7     Sig: TAKE 1 TABLET BY MOUTH DAILY    Contraceptives Protocol Failed    5/24/2018  1:42 AM       Failed - Recent (12 mo) or future (30 days) visit within the authorizing provider's specialty    Patient had office visit in the last 12 months or has a visit in the next 30 days with authorizing provider or within the authorizing provider's specialty.  See \"Patient Info\" tab in inbasket, or \"Choose Columns\" in Meds & Orders section of the refill encounter.           Passed - Patient is not a current smoker if age is 35 or older       Passed - No active pregnancy on record       Passed - No positive pregnancy test in past 12 months          "

## 2018-05-24 NOTE — TELEPHONE ENCOUNTER
Called patient and found out that she has changed her PCP. She has moved to a provider at [facility].  Patient will call pharmacy and let them know not to send refill request to Dr Delgado.    No more refills needed.    Yolanda Gu

## 2018-06-25 DIAGNOSIS — Z30.9 ENCOUNTER FOR CONTRACEPTIVE MANAGEMENT: ICD-10-CM

## 2018-06-25 RX ORDER — DESOGESTREL AND ETHINYL ESTRADIOL 0.15-0.03
KIT ORAL
Qty: 28 TABLET | Refills: 0 | OUTPATIENT
Start: 2018-06-25

## 2018-06-25 NOTE — TELEPHONE ENCOUNTER
5/24 encounter says:  She has moved to a provider at [facility].  Patient will call pharmacy and let them know not to send refill request to Dr Delgado. No more refills needed.  Sent denied with note.  Keiko Grey RN

## 2018-06-25 NOTE — TELEPHONE ENCOUNTER
"Requested Prescriptions   Pending Prescriptions Disp Refills     APRI 0.15-30 MG-MCG per tablet [Pharmacy Med Name: APRI 28 DAY TABLET]  Last Written Prescription Date:  5/24/2018  Last Fill Quantity: 28 tablet,  # refills: 0   Last office visit: 5/19/2017 with prescribing provider:  WILLIS Delgado   Future Office Visit:     28 tablet 0     Sig: TAKE 1 TABLET BY MOUTH EVERY DAY    Contraceptives Protocol Failed    6/25/2018 10:48 AM       Failed - Recent (12 mo) or future (30 days) visit within the authorizing provider's specialty    Patient had office visit in the last 12 months or has a visit in the next 30 days with authorizing provider or within the authorizing provider's specialty.  See \"Patient Info\" tab in inbasket, or \"Choose Columns\" in Meds & Orders section of the refill encounter.           Passed - Patient is not a current smoker if age is 35 or older       Passed - No active pregnancy on record       Passed - No positive pregnancy test in past 12 months          "

## 2019-08-12 ENCOUNTER — TELEPHONE (OUTPATIENT)
Dept: FAMILY MEDICINE | Facility: CLINIC | Age: 34
End: 2019-08-12

## 2019-08-12 NOTE — TELEPHONE ENCOUNTER
Panel Management Review      Patient has the following on her problem list:     Depression / Dysthymia review    Measure:  Needs PHQ-9 score of 4 or less during index window.  Administer PHQ-9 and if score is 5 or more, send encounter to provider for next steps.    5 - 7 month window range: Due now    PHQ-9 SCORE 5/11/2016 5/17/2017 11/20/2017   PHQ-9 Total Score - - -   PHQ-9 Total Score MyChart 13 (Moderate depression) 15 (Moderately severe depression) -   PHQ-9 Total Score - - 10       If PHQ-9 recheck is 5 or more, route to provider for next steps.    Patient is due for:  PHQ9      Composite cancer screening  Chart review shows that this patient is due/due soon for the following None  Summary:    Patient is due/failing the following:   PHQ9 and PHYSICAL    Action needed:   Patient needs office visit for Physical. and Patient needs to do PHQ9.    Type of outreach:    Sent K[employer]Z message.    Questions for provider review:    None                                                                                                                                    Esha Wahl MA       Chart routed to Care Team .

## 2019-08-12 NOTE — LETTER
[address] [id number] [phone]                                                                                                August 21, 2019    Ada Blunt  [address]  [place] [address]        Dear Ms. Blunt,    We care about your health and have reviewed your health plan. We have reviewed your medical conditions, medication list, and lab results and are making recommendations based on this review, to better manage your health.    You are in particular need of attention regarding:    - Your Depression  - Scheduling a Physical with your primary care provider     Please call us at [phone] (or use [employer]) to address the above recommendations.     Thank you for trusting [facility] with your healthcare needs. We appreciate the opportunity to serve you and look forward to supporting you in the future.    Healthy Regards,    Your Care Team

## 2019-08-19 NOTE — TELEPHONE ENCOUNTER
Patient has read [employer] message but no reponse.  Left message on answering machine for patient to call back.    Solange Oliveira MA

## 2020-02-24 ENCOUNTER — HEALTH MAINTENANCE LETTER (OUTPATIENT)
Age: 35
End: 2020-02-24

## 2020-12-13 ENCOUNTER — HEALTH MAINTENANCE LETTER (OUTPATIENT)
Age: 35
End: 2020-12-13

## 2021-04-17 ENCOUNTER — HEALTH MAINTENANCE LETTER (OUTPATIENT)
Age: 36
End: 2021-04-17

## 2021-09-26 ENCOUNTER — HEALTH MAINTENANCE LETTER (OUTPATIENT)
Age: 36
End: 2021-09-26

## 2022-05-08 ENCOUNTER — HEALTH MAINTENANCE LETTER (OUTPATIENT)
Age: 37
End: 2022-05-08

## 2023-01-14 ENCOUNTER — HEALTH MAINTENANCE LETTER (OUTPATIENT)
Age: 38
End: 2023-01-14

## 2023-06-02 ENCOUNTER — HEALTH MAINTENANCE LETTER (OUTPATIENT)
Age: 38
End: 2023-06-02

## 2023-11-03 ENCOUNTER — LAB REQUISITION (OUTPATIENT)
Dept: LAB | Facility: CLINIC | Age: 38
End: 2023-11-03

## 2023-11-03 DIAGNOSIS — Z11.3 ENCOUNTER FOR SCREENING FOR INFECTIONS WITH A PREDOMINANTLY SEXUAL MODE OF TRANSMISSION: ICD-10-CM

## 2023-11-03 PROCEDURE — 86803 HEPATITIS C AB TEST: CPT | Performed by: OBSTETRICS & GYNECOLOGY

## 2023-11-03 PROCEDURE — 87389 HIV-1 AG W/HIV-1&-2 AB AG IA: CPT | Performed by: OBSTETRICS & GYNECOLOGY

## 2023-11-03 PROCEDURE — 87340 HEPATITIS B SURFACE AG IA: CPT | Performed by: OBSTETRICS & GYNECOLOGY

## 2023-11-04 LAB
HBV SURFACE AG SERPL QL IA: NONREACTIVE
HCV AB SERPL QL IA: NONREACTIVE
HIV 1+2 AB+HIV1 P24 AG SERPL QL IA: NONREACTIVE